# Patient Record
Sex: FEMALE | Race: WHITE
[De-identification: names, ages, dates, MRNs, and addresses within clinical notes are randomized per-mention and may not be internally consistent; named-entity substitution may affect disease eponyms.]

---

## 2017-03-15 NOTE — NUR
REPORT GIVEN BY DAYSHIFT NURSE... PT ALERT, ORIENTED X 4, NO RESP DISTRESSS 
NOTED OR REPORTED UPON ASSESSMENT... WILL MONITOR FOR SAFETY, PAIN AND 
COMFORT...

## 2017-03-15 NOTE — NUR
Patient discharged to home in stable conditon.  Written and verbal after care 
instructions given. 

Patient verbalizes understanding of instructions. PT WALKED OUT OF ER 
UNASSISTED WITH BELONGINGS AT SIDE... STATES SON IS ON HIS WAY TO PICK HER 
UP...
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
## 2017-12-19 ENCOUNTER — HOSPITAL ENCOUNTER (EMERGENCY)
Dept: HOSPITAL 12 - ER | Age: 52
Discharge: HOME | End: 2017-12-19
Payer: COMMERCIAL

## 2017-12-19 VITALS — HEIGHT: 61 IN | BODY MASS INDEX: 37.76 KG/M2 | WEIGHT: 200 LBS

## 2017-12-19 VITALS — DIASTOLIC BLOOD PRESSURE: 74 MMHG | SYSTOLIC BLOOD PRESSURE: 112 MMHG

## 2017-12-19 DIAGNOSIS — G43.909: ICD-10-CM

## 2017-12-19 DIAGNOSIS — F17.200: ICD-10-CM

## 2017-12-19 DIAGNOSIS — J44.9: ICD-10-CM

## 2017-12-19 DIAGNOSIS — M54.12: Primary | ICD-10-CM

## 2017-12-19 DIAGNOSIS — Z88.0: ICD-10-CM

## 2017-12-19 DIAGNOSIS — M79.7: ICD-10-CM

## 2017-12-19 DIAGNOSIS — M25.512: ICD-10-CM

## 2017-12-19 DIAGNOSIS — Z88.2: ICD-10-CM

## 2017-12-19 DIAGNOSIS — M06.9: ICD-10-CM

## 2017-12-19 DIAGNOSIS — Z90.49: ICD-10-CM

## 2017-12-19 DIAGNOSIS — Z88.1: ICD-10-CM

## 2017-12-19 DIAGNOSIS — Z88.5: ICD-10-CM

## 2017-12-19 PROCEDURE — A4663 DIALYSIS BLOOD PRESSURE CUFF: HCPCS

## 2017-12-19 NOTE — NUR
mse completed, meds admin, left shoulder sling placed, aci/rx 2 given. pt d/c'd 
home,pt ambulated w/o diff/took all belongings,  present and to drive.

## 2018-03-03 ENCOUNTER — HOSPITAL ENCOUNTER (INPATIENT)
Dept: HOSPITAL 12 - ER | Age: 53
LOS: 2 days | Discharge: LEFT BEFORE DISCHARGE | DRG: 103 | End: 2018-03-05
Payer: COMMERCIAL

## 2018-03-03 VITALS — WEIGHT: 209 LBS | BODY MASS INDEX: 35.68 KG/M2 | HEIGHT: 64 IN

## 2018-03-03 VITALS — SYSTOLIC BLOOD PRESSURE: 119 MMHG | DIASTOLIC BLOOD PRESSURE: 72 MMHG

## 2018-03-03 DIAGNOSIS — E66.8: ICD-10-CM

## 2018-03-03 DIAGNOSIS — M79.7: ICD-10-CM

## 2018-03-03 DIAGNOSIS — G62.9: ICD-10-CM

## 2018-03-03 DIAGNOSIS — D86.0: ICD-10-CM

## 2018-03-03 DIAGNOSIS — M06.9: ICD-10-CM

## 2018-03-03 DIAGNOSIS — F11.20: ICD-10-CM

## 2018-03-03 DIAGNOSIS — M81.0: ICD-10-CM

## 2018-03-03 DIAGNOSIS — G89.4: ICD-10-CM

## 2018-03-03 DIAGNOSIS — Z88.0: ICD-10-CM

## 2018-03-03 DIAGNOSIS — R51: Primary | ICD-10-CM

## 2018-03-03 DIAGNOSIS — Z88.2: ICD-10-CM

## 2018-03-03 DIAGNOSIS — F90.9: ICD-10-CM

## 2018-03-03 DIAGNOSIS — Z79.899: ICD-10-CM

## 2018-03-03 DIAGNOSIS — F31.9: ICD-10-CM

## 2018-03-03 DIAGNOSIS — Z80.41: ICD-10-CM

## 2018-03-03 DIAGNOSIS — H20.9: ICD-10-CM

## 2018-03-03 DIAGNOSIS — Z88.6: ICD-10-CM

## 2018-03-03 LAB
ALP SERPL-CCNC: 133 U/L (ref 50–136)
ALT SERPL W/O P-5'-P-CCNC: 43 U/L (ref 14–59)
AST SERPL-CCNC: 22 U/L (ref 15–37)
BASOPHILS # BLD AUTO: 0 K/UL (ref 0–8)
BASOPHILS NFR BLD AUTO: 0.3 % (ref 0–2)
BILIRUB DIRECT SERPL-MCNC: < 0.1 MG/DL (ref 0–0.2)
BILIRUB SERPL-MCNC: 0.2 MG/DL (ref 0.2–1)
BUN SERPL-MCNC: 16 MG/DL (ref 7–18)
CHLORIDE SERPL-SCNC: 102 MMOL/L (ref 98–107)
CO2 SERPL-SCNC: 28 MMOL/L (ref 21–32)
CREAT SERPL-MCNC: 0.8 MG/DL (ref 0.6–1.3)
EOSINOPHIL # BLD AUTO: 0.1 K/UL (ref 0–0.7)
EOSINOPHIL NFR BLD AUTO: 1.4 % (ref 0–7)
GLUCOSE SERPL-MCNC: 85 MG/DL (ref 74–106)
HCT VFR BLD AUTO: 44.9 % (ref 31.2–41.9)
HGB BLD-MCNC: 14.9 G/DL (ref 10.9–14.3)
LYMPHOCYTES # BLD AUTO: 2.9 K/UL (ref 20–40)
LYMPHOCYTES NFR BLD AUTO: 28.4 % (ref 20.5–51.5)
MCH RBC QN AUTO: 28.3 UUG (ref 24.7–32.8)
MCHC RBC AUTO-ENTMCNC: 33 G/DL (ref 32.3–35.6)
MCV RBC AUTO: 85.3 FL (ref 75.5–95.3)
MONOCYTES # BLD AUTO: 0.9 K/UL (ref 2–10)
MONOCYTES NFR BLD AUTO: 9.2 % (ref 0–11)
NEUTROPHILS # BLD AUTO: 6.1 K/UL (ref 1.8–8.9)
NEUTROPHILS NFR BLD AUTO: 60.7 % (ref 38.5–71.5)
PLATELET # BLD AUTO: 334 K/UL (ref 179–408)
POTASSIUM SERPL-SCNC: 4.2 MMOL/L (ref 3.5–5.1)
RBC # BLD AUTO: 5.26 MIL/UL (ref 3.63–4.92)
WBC # BLD AUTO: 10.1 K/UL (ref 3.8–11.8)
WS STN SPEC: 7.8 G/DL (ref 6.4–8.2)

## 2018-03-03 PROCEDURE — A4663 DIALYSIS BLOOD PRESSURE CUFF: HCPCS

## 2018-03-03 RX ADMIN — SODIUM CHLORIDE PRN MG: 9 INJECTION, SOLUTION INTRAVENOUS at 20:50

## 2018-03-03 RX ADMIN — SODIUM CHLORIDE PRN MLS/HR: 0.9 INJECTION, SOLUTION INTRAVENOUS at 20:52

## 2018-03-04 VITALS — SYSTOLIC BLOOD PRESSURE: 121 MMHG | DIASTOLIC BLOOD PRESSURE: 70 MMHG

## 2018-03-04 VITALS — SYSTOLIC BLOOD PRESSURE: 108 MMHG | DIASTOLIC BLOOD PRESSURE: 58 MMHG

## 2018-03-04 VITALS — SYSTOLIC BLOOD PRESSURE: 113 MMHG | DIASTOLIC BLOOD PRESSURE: 68 MMHG

## 2018-03-04 VITALS — SYSTOLIC BLOOD PRESSURE: 133 MMHG | DIASTOLIC BLOOD PRESSURE: 74 MMHG

## 2018-03-04 LAB
APPEARANCE UR: CLEAR
BASOPHILS # BLD AUTO: 0.1 K/UL (ref 0–8)
BASOPHILS NFR BLD AUTO: 1.1 % (ref 0–2)
BILIRUB UR QL STRIP: NEGATIVE
BUN SERPL-MCNC: 18 MG/DL (ref 7–18)
CHLORIDE SERPL-SCNC: 105 MMOL/L (ref 98–107)
CO2 SERPL-SCNC: 31 MMOL/L (ref 21–32)
COLOR UR: YELLOW
CREAT SERPL-MCNC: 0.7 MG/DL (ref 0.6–1.3)
DEPRECATED SQUAMOUS URNS QL MICRO: (no result) /HPF
EOSINOPHIL # BLD AUTO: 0.1 K/UL (ref 0–0.7)
EOSINOPHIL NFR BLD AUTO: 1.4 % (ref 0–7)
GLUCOSE SERPL-MCNC: 109 MG/DL (ref 74–106)
GLUCOSE UR STRIP-MCNC: NEGATIVE MG/DL
HCT VFR BLD AUTO: 39.3 % (ref 31.2–41.9)
HGB BLD-MCNC: 13.1 G/DL (ref 10.9–14.3)
HGB UR QL STRIP: NEGATIVE
KETONES UR STRIP-MCNC: NEGATIVE MG/DL
LEUKOCYTE ESTERASE UR QL STRIP: NEGATIVE
LYMPHOCYTES # BLD AUTO: 2.2 K/UL (ref 20–40)
LYMPHOCYTES NFR BLD AUTO: 34 % (ref 20.5–51.5)
MAGNESIUM SERPL-MCNC: 2.1 MG/DL (ref 1.8–2.4)
MCH RBC QN AUTO: 28.7 UUG (ref 24.7–32.8)
MCHC RBC AUTO-ENTMCNC: 33 G/DL (ref 32.3–35.6)
MCV RBC AUTO: 85.8 FL (ref 75.5–95.3)
MONOCYTES # BLD AUTO: 0.6 K/UL (ref 2–10)
MONOCYTES NFR BLD AUTO: 9.7 % (ref 0–11)
NEUTROPHILS # BLD AUTO: 3.4 K/UL (ref 1.8–8.9)
NEUTROPHILS NFR BLD AUTO: 53.8 % (ref 38.5–71.5)
NITRITE UR QL STRIP: NEGATIVE
PH UR STRIP: 6.5 [PH] (ref 5–8)
PHOSPHATE SERPL-MCNC: 4.2 MG/DL (ref 2.5–4.9)
PLATELET # BLD AUTO: 276 K/UL (ref 179–408)
POTASSIUM SERPL-SCNC: 4.2 MMOL/L (ref 3.5–5.1)
PROT UR QL STRIP: (no result)
RBC # BLD AUTO: 4.58 MIL/UL (ref 3.63–4.92)
RBC #/AREA URNS HPF: (no result) /HPF (ref 0–3)
SP GR UR STRIP: 1.02 (ref 1–1.03)
UROBILINOGEN UR STRIP-MCNC: 0.2 E.U./DL
WBC # BLD AUTO: 6.4 K/UL (ref 3.8–11.8)
WBC #/AREA URNS HPF: (no result) /HPF
WBC #/AREA URNS HPF: (no result) /HPF (ref 0–3)

## 2018-03-04 RX ADMIN — HYDROMORPHONE HYDROCHLORIDE PRN MG: 2 INJECTION, SOLUTION INTRAMUSCULAR; INTRAVENOUS; SUBCUTANEOUS at 01:10

## 2018-03-04 RX ADMIN — MORPHINE SULFATE PRN MG: 4 INJECTION, SOLUTION INTRAMUSCULAR; INTRAVENOUS at 15:01

## 2018-03-04 RX ADMIN — SODIUM CHLORIDE PRN MG: 9 INJECTION, SOLUTION INTRAVENOUS at 10:11

## 2018-03-04 RX ADMIN — MORPHINE SULFATE PRN MG: 4 INJECTION, SOLUTION INTRAMUSCULAR; INTRAVENOUS at 20:42

## 2018-03-04 RX ADMIN — TOPIRAMATE SCH MG: 25 TABLET, COATED ORAL at 11:53

## 2018-03-04 RX ADMIN — TOPIRAMATE SCH MG: 25 TABLET, COATED ORAL at 20:44

## 2018-03-04 RX ADMIN — SODIUM CHLORIDE PRN MG: 9 INJECTION, SOLUTION INTRAVENOUS at 02:24

## 2018-03-04 RX ADMIN — SODIUM CHLORIDE PRN MLS/HR: 0.9 INJECTION, SOLUTION INTRAVENOUS at 15:01

## 2018-03-04 RX ADMIN — HYDROMORPHONE HYDROCHLORIDE PRN MG: 2 INJECTION, SOLUTION INTRAMUSCULAR; INTRAVENOUS; SUBCUTANEOUS at 09:06

## 2018-03-05 VITALS — SYSTOLIC BLOOD PRESSURE: 114 MMHG | DIASTOLIC BLOOD PRESSURE: 65 MMHG

## 2018-03-05 RX ADMIN — MORPHINE SULFATE PRN MG: 4 INJECTION, SOLUTION INTRAMUSCULAR; INTRAVENOUS at 02:34

## 2018-03-05 RX ADMIN — MORPHINE SULFATE PRN MG: 4 INJECTION, SOLUTION INTRAMUSCULAR; INTRAVENOUS at 06:57

## 2018-06-26 ENCOUNTER — HOSPITAL ENCOUNTER (EMERGENCY)
Dept: HOSPITAL 12 - ER | Age: 53
Discharge: HOME | End: 2018-06-26
Payer: COMMERCIAL

## 2018-06-26 VITALS — WEIGHT: 192 LBS | HEIGHT: 65 IN | BODY MASS INDEX: 31.99 KG/M2

## 2018-06-26 VITALS — DIASTOLIC BLOOD PRESSURE: 84 MMHG | SYSTOLIC BLOOD PRESSURE: 136 MMHG

## 2018-06-26 DIAGNOSIS — Z90.49: ICD-10-CM

## 2018-06-26 DIAGNOSIS — Z88.1: ICD-10-CM

## 2018-06-26 DIAGNOSIS — Z88.5: ICD-10-CM

## 2018-06-26 DIAGNOSIS — Z88.2: ICD-10-CM

## 2018-06-26 DIAGNOSIS — Z88.8: ICD-10-CM

## 2018-06-26 DIAGNOSIS — F17.210: ICD-10-CM

## 2018-06-26 DIAGNOSIS — J44.9: ICD-10-CM

## 2018-06-26 DIAGNOSIS — Z88.0: ICD-10-CM

## 2018-06-26 DIAGNOSIS — M23.92: Primary | ICD-10-CM

## 2018-06-26 DIAGNOSIS — Z79.899: ICD-10-CM

## 2018-06-26 PROCEDURE — A4663 DIALYSIS BLOOD PRESSURE CUFF: HCPCS

## 2018-06-26 PROCEDURE — 29505 APPLICATION LONG LEG SPLINT: CPT

## 2018-06-26 PROCEDURE — 73700 CT LOWER EXTREMITY W/O DYE: CPT

## 2018-06-26 PROCEDURE — 99284 EMERGENCY DEPT VISIT MOD MDM: CPT

## 2018-06-26 PROCEDURE — 96372 THER/PROPH/DIAG INJ SC/IM: CPT

## 2018-06-26 NOTE — NUR
Patient discharged to home in stable conditon.  Written and verbal after care 
instructions given. 

Patient verbalizes understanding of instructions. Patient left with all 
personal belongings.

## 2018-07-12 ENCOUNTER — HOSPITAL ENCOUNTER (EMERGENCY)
Dept: HOSPITAL 12 - ER | Age: 53
Discharge: HOME | End: 2018-07-12
Payer: COMMERCIAL

## 2018-07-12 VITALS — WEIGHT: 195 LBS | BODY MASS INDEX: 32.49 KG/M2 | HEIGHT: 65 IN

## 2018-07-12 VITALS — SYSTOLIC BLOOD PRESSURE: 138 MMHG | DIASTOLIC BLOOD PRESSURE: 74 MMHG

## 2018-07-12 DIAGNOSIS — Z88.1: ICD-10-CM

## 2018-07-12 DIAGNOSIS — M23.92: Primary | ICD-10-CM

## 2018-07-12 DIAGNOSIS — Z88.0: ICD-10-CM

## 2018-07-12 DIAGNOSIS — Z88.2: ICD-10-CM

## 2018-07-12 DIAGNOSIS — Z88.5: ICD-10-CM

## 2018-07-12 DIAGNOSIS — Z88.8: ICD-10-CM

## 2018-07-12 DIAGNOSIS — Z90.49: ICD-10-CM

## 2018-07-12 DIAGNOSIS — J44.9: ICD-10-CM

## 2018-07-12 DIAGNOSIS — Z79.899: ICD-10-CM

## 2018-07-12 PROCEDURE — A4663 DIALYSIS BLOOD PRESSURE CUFF: HCPCS

## 2018-07-12 NOTE — NUR
Pt ambulated to ER, c/o pain on left knee. Pt states she tore her meniscus and 
was treated in the ER last week, was scheduled for surgery sometime this week, 
and last night she fell and hurt left knee.

## 2018-09-24 ENCOUNTER — HOSPITAL ENCOUNTER (EMERGENCY)
Dept: HOSPITAL 12 - ER | Age: 53
Discharge: OTHER INSTITUTION | End: 2018-09-24
Payer: COMMERCIAL

## 2018-09-24 VITALS — SYSTOLIC BLOOD PRESSURE: 121 MMHG | DIASTOLIC BLOOD PRESSURE: 66 MMHG

## 2018-09-24 VITALS — BODY MASS INDEX: 32.44 KG/M2 | HEIGHT: 64 IN | WEIGHT: 190 LBS

## 2018-09-24 DIAGNOSIS — F17.200: ICD-10-CM

## 2018-09-24 DIAGNOSIS — Z88.5: ICD-10-CM

## 2018-09-24 DIAGNOSIS — Z88.0: ICD-10-CM

## 2018-09-24 DIAGNOSIS — Z88.1: ICD-10-CM

## 2018-09-24 DIAGNOSIS — J44.9: ICD-10-CM

## 2018-09-24 DIAGNOSIS — Z88.2: ICD-10-CM

## 2018-09-24 DIAGNOSIS — Z88.8: ICD-10-CM

## 2018-09-24 DIAGNOSIS — R10.9: Primary | ICD-10-CM

## 2018-09-24 LAB
ALP SERPL-CCNC: 133 U/L (ref 50–136)
ALT SERPL W/O P-5'-P-CCNC: 41 U/L (ref 14–59)
APPEARANCE UR: CLEAR
AST SERPL-CCNC: 34 U/L (ref 15–37)
BASOPHILS # BLD AUTO: 0.1 K/UL (ref 0–8)
BASOPHILS NFR BLD AUTO: 1.1 % (ref 0–2)
BILIRUB DIRECT SERPL-MCNC: < 0.1 MG/DL (ref 0–0.2)
BILIRUB SERPL-MCNC: 0.2 MG/DL (ref 0.2–1)
BILIRUB UR QL STRIP: NEGATIVE
BUN SERPL-MCNC: 16 MG/DL (ref 7–18)
CHLORIDE SERPL-SCNC: 105 MMOL/L (ref 98–107)
CO2 SERPL-SCNC: 29 MMOL/L (ref 21–32)
COLOR UR: YELLOW
CREAT SERPL-MCNC: 0.8 MG/DL (ref 0.6–1.3)
EOSINOPHIL # BLD AUTO: 0.2 K/UL (ref 0–0.7)
EOSINOPHIL NFR BLD AUTO: 2.5 % (ref 0–7)
GLUCOSE SERPL-MCNC: 111 MG/DL (ref 74–106)
GLUCOSE UR STRIP-MCNC: NEGATIVE MG/DL
HCG UR QL: NEGATIVE
HCT VFR BLD AUTO: 41.4 % (ref 31.2–41.9)
HGB BLD-MCNC: 13.6 G/DL (ref 10.9–14.3)
HGB UR QL STRIP: NEGATIVE
KETONES UR STRIP-MCNC: NEGATIVE MG/DL
LEUKOCYTE ESTERASE UR QL STRIP: NEGATIVE
LIPASE SERPL-CCNC: 218 U/L (ref 73–393)
LYMPHOCYTES # BLD AUTO: 1.6 K/UL (ref 20–40)
LYMPHOCYTES NFR BLD AUTO: 25.1 % (ref 20.5–51.5)
MCH RBC QN AUTO: 28.6 UUG (ref 24.7–32.8)
MCHC RBC AUTO-ENTMCNC: 33 G/DL (ref 32.3–35.6)
MCV RBC AUTO: 86.9 FL (ref 75.5–95.3)
MONOCYTES # BLD AUTO: 0.5 K/UL (ref 2–10)
MONOCYTES NFR BLD AUTO: 7.4 % (ref 0–11)
NEUTROPHILS # BLD AUTO: 4.1 K/UL (ref 1.8–8.9)
NEUTROPHILS NFR BLD AUTO: 63.9 % (ref 38.5–71.5)
NITRITE UR QL STRIP: NEGATIVE
PH UR STRIP: 8 [PH] (ref 5–8)
PLATELET # BLD AUTO: 278 K/UL (ref 179–408)
POTASSIUM SERPL-SCNC: 4.2 MMOL/L (ref 3.5–5.1)
PROT UR QL STRIP: NEGATIVE
RBC # BLD AUTO: 4.77 MIL/UL (ref 3.63–4.92)
SP GR UR STRIP: 1.02 (ref 1–1.03)
UROBILINOGEN UR STRIP-MCNC: 0.2 E.U./DL
WBC # BLD AUTO: 6.5 K/UL (ref 3.8–11.8)
WS STN SPEC: 7.2 G/DL (ref 6.4–8.2)

## 2018-09-24 PROCEDURE — A4663 DIALYSIS BLOOD PRESSURE CUFF: HCPCS

## 2018-09-24 NOTE — NUR
Patient discharged to home in stable conditon.  Written and verbal after care 
instructions given. 

Patient verbalizes understanding of instructions.pt walks in steady gait, pt 
deneis pain or nausea. pt not driving. pt with

## 2019-07-31 ENCOUNTER — HOSPITAL ENCOUNTER (EMERGENCY)
Dept: HOSPITAL 12 - ER | Age: 54
Discharge: HOME | End: 2019-07-31
Payer: COMMERCIAL

## 2019-07-31 VITALS — SYSTOLIC BLOOD PRESSURE: 142 MMHG | DIASTOLIC BLOOD PRESSURE: 81 MMHG

## 2019-07-31 VITALS — BODY MASS INDEX: 31.58 KG/M2 | HEIGHT: 64 IN | WEIGHT: 185 LBS

## 2019-07-31 DIAGNOSIS — Z79.899: ICD-10-CM

## 2019-07-31 DIAGNOSIS — Z88.8: ICD-10-CM
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
## 2019-07-31 DIAGNOSIS — Z88.1: ICD-10-CM

## 2019-07-31 DIAGNOSIS — Z88.2: ICD-10-CM

## 2019-07-31 DIAGNOSIS — G43.909: ICD-10-CM
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
## 2019-07-31 DIAGNOSIS — S83.92XA: Primary | ICD-10-CM

## 2019-07-31 DIAGNOSIS — F17.290: ICD-10-CM

## 2019-07-31 DIAGNOSIS — F31.9: ICD-10-CM

## 2019-07-31 DIAGNOSIS — Z88.0: ICD-10-CM

## 2019-07-31 DIAGNOSIS — Z71.6: ICD-10-CM
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
## 2019-07-31 DIAGNOSIS — M25.512: ICD-10-CM

## 2019-07-31 DIAGNOSIS — Z90.49: ICD-10-CM

## 2019-07-31 DIAGNOSIS — F41.9: ICD-10-CM

## 2019-07-31 DIAGNOSIS — J44.9: ICD-10-CM

## 2019-07-31 PROCEDURE — A4663 DIALYSIS BLOOD PRESSURE CUFF: HCPCS

## 2019-07-31 RX ADMIN — HYDROMORPHONE HYDROCHLORIDE ONE MG: 1 INJECTION, SOLUTION INTRAMUSCULAR; INTRAVENOUS; SUBCUTANEOUS at 03:40

## 2019-07-31 RX ADMIN — HYDROMORPHONE HYDROCHLORIDE ONE MG: 1 INJECTION, SOLUTION INTRAMUSCULAR; INTRAVENOUS; SUBCUTANEOUS at 03:06

## 2019-07-31 RX ADMIN — ONDANSETRON ONE MG: 4 TABLET, ORALLY DISINTEGRATING ORAL at 02:56
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
## 2019-07-31 NOTE — NUR
-------------------------------------------------------------------------------

            *** Note undone in ED - 07/31/19 at 0404 by KAELYN ***            

-------------------------------------------------------------------------------

Patient discharged to home in stable conditon.  Written and verbal after care 
instructions given. 

Patient verbalizes understanding of instructions. Patient ambulated with stable 
gait.
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
## 2019-08-03 ENCOUNTER — HOSPITAL ENCOUNTER (EMERGENCY)
Dept: HOSPITAL 10 - E/R | Age: 54
Discharge: HOME | End: 2019-08-03
Payer: SELF-PAY

## 2019-08-03 VITALS
WEIGHT: 202.83 LBS | HEIGHT: 67 IN | WEIGHT: 202.83 LBS | BODY MASS INDEX: 31.83 KG/M2 | BODY MASS INDEX: 31.83 KG/M2 | HEIGHT: 67 IN
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
## 2019-08-03 DIAGNOSIS — S80.02XA: Primary | ICD-10-CM

## 2019-08-03 PROCEDURE — 73562 X-RAY EXAM OF KNEE 3: CPT
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
## 2019-11-22 ENCOUNTER — HOSPITAL ENCOUNTER (EMERGENCY)
Dept: HOSPITAL 12 - ER | Age: 54
Discharge: HOME | End: 2019-11-22
Payer: COMMERCIAL

## 2019-11-22 VITALS — HEIGHT: 64 IN | WEIGHT: 185 LBS | BODY MASS INDEX: 31.58 KG/M2
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
## 2019-11-22 DIAGNOSIS — Z88.2: ICD-10-CM

## 2019-11-22 DIAGNOSIS — Z88.1: ICD-10-CM

## 2019-11-22 DIAGNOSIS — M79.672: ICD-10-CM

## 2019-11-22 DIAGNOSIS — Z88.8: ICD-10-CM

## 2019-11-22 DIAGNOSIS — Z88.0: ICD-10-CM

## 2019-11-22 DIAGNOSIS — F17.200: ICD-10-CM
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
## 2019-11-22 DIAGNOSIS — G43.909: ICD-10-CM

## 2019-11-22 DIAGNOSIS — Z79.899: ICD-10-CM
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
## 2019-11-22 DIAGNOSIS — Z90.49: ICD-10-CM

## 2019-11-22 DIAGNOSIS — S93.601A: Primary | ICD-10-CM

## 2019-11-22 DIAGNOSIS — F41.9: ICD-10-CM

## 2019-11-22 DIAGNOSIS — J44.9: ICD-10-CM

## 2019-11-22 DIAGNOSIS — Z88.5: ICD-10-CM

## 2019-11-22 DIAGNOSIS — M25.552: ICD-10-CM

## 2019-11-22 PROCEDURE — 96372 THER/PROPH/DIAG INJ SC/IM: CPT

## 2019-11-22 PROCEDURE — A4663 DIALYSIS BLOOD PRESSURE CUFF: HCPCS

## 2019-11-22 PROCEDURE — 73630 X-RAY EXAM OF FOOT: CPT

## 2019-11-22 PROCEDURE — 99283 EMERGENCY DEPT VISIT LOW MDM: CPT
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
## 2020-01-05 ENCOUNTER — HOSPITAL ENCOUNTER (EMERGENCY)
Dept: HOSPITAL 12 - ER | Age: 55
Discharge: HOME | End: 2020-01-05
Payer: COMMERCIAL

## 2020-01-05 VITALS — SYSTOLIC BLOOD PRESSURE: 136 MMHG | DIASTOLIC BLOOD PRESSURE: 83 MMHG

## 2020-01-05 VITALS — BODY MASS INDEX: 32.44 KG/M2 | HEIGHT: 64 IN | WEIGHT: 190 LBS

## 2020-01-05 DIAGNOSIS — Z88.5: ICD-10-CM
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
## 2020-01-05 DIAGNOSIS — Z79.899: ICD-10-CM

## 2020-01-05 DIAGNOSIS — Z88.1: ICD-10-CM

## 2020-01-05 DIAGNOSIS — S01.312A: Primary | ICD-10-CM
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
## 2020-01-05 DIAGNOSIS — F31.9: ICD-10-CM

## 2020-01-05 DIAGNOSIS — Z88.0: ICD-10-CM
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
## 2020-01-05 DIAGNOSIS — Z88.8: ICD-10-CM

## 2020-01-05 DIAGNOSIS — F17.200: ICD-10-CM

## 2020-01-05 DIAGNOSIS — F41.9: ICD-10-CM

## 2020-01-05 DIAGNOSIS — J44.9: ICD-10-CM

## 2020-01-05 DIAGNOSIS — Z88.2: ICD-10-CM

## 2020-01-05 PROCEDURE — 70480 CT ORBIT/EAR/FOSSA W/O DYE: CPT

## 2020-01-05 PROCEDURE — A4663 DIALYSIS BLOOD PRESSURE CUFF: HCPCS

## 2020-01-05 PROCEDURE — 99284 EMERGENCY DEPT VISIT MOD MDM: CPT

## 2020-01-05 PROCEDURE — 96372 THER/PROPH/DIAG INJ SC/IM: CPT

## 2020-03-09 NOTE — NUR
Patient admitted from er in stable condition and no signs of distress; admission completed. 
Report given to oncoming nurse.

## 2020-03-09 NOTE — NUR
CALLED [facility], TALKED TO SEFERINO. MRI SCHEDULED FOR 1100 
CALLED [facility], PICKING TIME 1000. TRIP NUMBER [id number].

## 2020-03-09 NOTE — NUR
pt very restless, walking around co pain 10/10 on the left buttock, radiating 
to left flank. comfort measures provided , md notified. order for pain 
management recieved. pt  at bedside on and off.

## 2020-03-10 NOTE — NUR
Patient slept well through the night with pain medications given PRN.Episodes of waking up 
secondary to pain, Dilaudid given 2x during shift, Toradol given 1x, both as ordered. Able 
to ambulate to the bathroom unassisted, reinforced importance of asking for help since she 
is taking pain medications. Slowly complying. With multiple demands throughout shift, all 
needs attended. When awake, pt is alert and verbally responsive, with episodes of getting 
easily anxious and restless, easily redirected. Kept safe duringh shift. Will continue to 
monitor and endorse accordingly.

## 2020-03-10 NOTE — NUR
Patient discharged home in stable condition. Discharge instructions explained and signed by 
patient. Patient verbalized understanding. Patients belongings and medications given back 
and signed. Patient accompanied by CNA and ambulated to [facility] and left in private car. 2100 
IV medication not given, no IV access.

## 2020-03-10 NOTE — NUR
Patient calm and comfortable through out shift with no signs of distress; patient managed 
with prn pain medication; patient with stable vital signs and no signs of distress.

## 2020-06-06 NOTE — NUR
IV removed by ANNA Mcfarland.  Catheter intact and site benign. Pressure and 4x4 
gauze applied to site. No bleeding noted.  Patient discharged to home in stable 
condition and slow steady gait.  Written and verbal after care instructions 
given by ANNA Mcfarland. Patient verbalized understanding and compliance of 
instructions. Stressed follow up for repeat IV Vanco in 12 hours or return to 
ER for worsening s/s.

## 2020-06-06 NOTE — NUR
Patient eloped from facility. Patient stated she doesn't want to be in the 
hospital any longer.  ER physician notified.

## 2020-06-07 NOTE — NUR
Pt brought herself in, steady gait. Pt is AO x 4. According to patient, she is 
back for a second dose of Vancomycin as instructed by MD she saw earlier in the 
day here in [place] ER. Pt's main complaint is swelling on L foot. Not in any 
distress at this time. Respirations even and unlabored. VS taken was within 
normal limits. 



Fall and safety precautions maintained.

## 2020-06-07 NOTE — NUR
Pt is back for R foot cellulitis, IV infusion. RN saw this pt earlier during 
the day as well. No changes since then. Foot swelling and redness actually 
appears slightly better, less red and swollen at this time. No other 
complaints. All MD orders noted and carried out. IV Vancomycin started, on R AC 
20G, running well with no s/sx of allergy and other adverse reactions. Placed 
in comfortable position. Ready for DC, waiting for infusion to finish.

## 2020-06-07 NOTE — NUR
After evaluation, MD ordered COVID19 swab on patient. Placed pt on 
airborne/droplet precautions as PUI. Isolation precautions followed. Due meds 
given. IV Vanco started. Side rails up x 2. Fall and safety precautions 
maintained.

## 2020-06-07 NOTE — NUR
IV removed.  Catheter intact and site benign. Pressure and 4x4 gauze applied to 
site. No bleeding noted. Patient discharged to home in stable condition.  
Written and verbal after care instructions given. Patient verbalizes 
understanding of instructions. Stressed follow up or return to ER for worsening 
s/s. Patient ambulated to w/c without assistance. Patient taken to patient's 
car outside of ER via w/c. Patient able to ambulate into passenger side of car
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
## 2020-06-08 NOTE — NUR
Patient discharged to home in stable condition.  Written and verbal after care 
instructions given. 

Patient verbalizes understanding of instructions. Stressed follow up or return 
to ER for worsening s/s. Ambulated out of ER in steady gait.

## 2020-06-09 NOTE — NUR
Patient discharged to home in stable condition.  Verbal after care instructions 
given  per DR Dailey. Patient verbalizes understanding of instructions. 
Stressed follow up or return to ER for worsening s/s.

## 2020-06-21 NOTE — NUR
Patient discharged to home in stable condition.  Written and verbal after care 
instructions given. 

Patient verbalizes understanding of instructions. Stressed follow up or return 
to ER for worsening s/s.t walks in steady gait. pt son waiting for pt to take 
the pt home. pt denies n/v/pain at this time.

## 2020-06-22 NOTE — NUR
Pt requeste to have IV removed.  Catheter intact and site benign. Pressure and 
4x4 gauze applied to site. No bleeding noted. Patient discharged to home in 
stable condition.  Written and verbal after care instructions given. Patient 
verbalizes understanding of instructions. Stressed follow up or return to ER 
for worsening s/s.

## 2020-06-24 NOTE — NUR
PATIENT STATES PAIN HAS DIMINISHED.  HER  ARRIVED TO DRIVE HER HOME.  

DC AND FOLLOW UP INSTRUCTIONS GIVEN AND EXPLAINED TO PATIENT WHO STATES SHE 
UNDERSTANDS ALL INSTRUCTIONS INCLUDING NARCOTIC PRECAUTIONS.

## 2020-06-27 NOTE — NUR
Patient discharged to home in stable condition.  Written and verbal after care 
instructions given. 

Patient verbalizes understanding of instructions. Stressed follow up or return 
to ER for worsening s/s. Pt ambulated out of the ER with steady gait. All 
belongings with pt.

## 2020-10-13 NOTE — NUR
Patient discharged to home in stable condition.  Written and verbal after care 
instructions given. 

Patient verbalizes understanding of instructions. Stressed follow up or return 
to ER for worsening s/s. Patient able to ambulate with stable gait.

## 2022-06-23 NOTE — NUR
Please approve or deny     Last Visit Date:  2/21/2022   TAMELA    Next Visit Date:    8/22/2022 Pt brought herself to ER. Ambulatory with steady gait and AO x 4. Per patient, 
she is back for wound check on L foot as instructed to receive another dose of 
IV antibiotics. Dr. Delgado is at bedside for MSE. Pt complains of 6/10 pain on 
the L foot, and nausea. No other complaints noted. Breathing is even and 
unlabored. VS stable. 

Bed locked in place.